# Patient Record
Sex: FEMALE | Race: WHITE
[De-identification: names, ages, dates, MRNs, and addresses within clinical notes are randomized per-mention and may not be internally consistent; named-entity substitution may affect disease eponyms.]

---

## 2018-05-23 ENCOUNTER — HOSPITAL ENCOUNTER (OUTPATIENT)
Dept: HOSPITAL 62 - ER | Age: 38
LOS: 1 days | Discharge: HOME | End: 2018-05-24
Attending: OBSTETRICS & GYNECOLOGY
Payer: COMMERCIAL

## 2018-05-23 DIAGNOSIS — E66.9: ICD-10-CM

## 2018-05-23 DIAGNOSIS — K66.1: Primary | ICD-10-CM

## 2018-05-23 DIAGNOSIS — O00.101: ICD-10-CM

## 2018-05-23 LAB
ADD MANUAL DIFF: NO
APPEARANCE UR: CLEAR
APTT PPP: YELLOW S
BASOPHILS # BLD AUTO: 0.1 10^3/UL (ref 0–0.2)
BASOPHILS NFR BLD AUTO: 0.4 % (ref 0–2)
BILIRUB UR QL STRIP: NEGATIVE
EOSINOPHIL # BLD AUTO: 0.1 10^3/UL (ref 0–0.6)
EOSINOPHIL NFR BLD AUTO: 0.6 % (ref 0–6)
ERYTHROCYTE [DISTWIDTH] IN BLOOD BY AUTOMATED COUNT: 13 % (ref 11.5–14)
GLUCOSE UR STRIP-MCNC: NEGATIVE MG/DL
HCT VFR BLD CALC: 36.8 % (ref 36–47)
HGB BLD-MCNC: 12.4 G/DL (ref 12–15.5)
KETONES UR STRIP-MCNC: NEGATIVE MG/DL
LYMPHOCYTES # BLD AUTO: 2.7 10^3/UL (ref 0.5–4.7)
LYMPHOCYTES NFR BLD AUTO: 16 % (ref 13–45)
MCH RBC QN AUTO: 30.1 PG (ref 27–33.4)
MCHC RBC AUTO-ENTMCNC: 33.8 G/DL (ref 32–36)
MCV RBC AUTO: 89 FL (ref 80–97)
MONOCYTES # BLD AUTO: 1 10^3/UL (ref 0.1–1.4)
MONOCYTES NFR BLD AUTO: 5.8 % (ref 3–13)
NEUTROPHILS # BLD AUTO: 13.1 10^3/UL (ref 1.7–8.2)
NEUTS SEG NFR BLD AUTO: 77.2 % (ref 42–78)
NITRITE UR QL STRIP: NEGATIVE
PH UR STRIP: 6 [PH] (ref 5–9)
PLATELET # BLD: 359 10^3/UL (ref 150–450)
PROT UR STRIP-MCNC: NEGATIVE MG/DL
RBC # BLD AUTO: 4.13 10^6/UL (ref 3.72–5.28)
SP GR UR STRIP: 1.01
TOTAL CELLS COUNTED % (AUTO): 100 %
UROBILINOGEN UR-MCNC: 4 MG/DL (ref ?–2)
WBC # BLD AUTO: 17 10^3/UL (ref 4–10.5)

## 2018-05-23 PROCEDURE — 88305 TISSUE EXAM BY PATHOLOGIST: CPT

## 2018-05-23 PROCEDURE — 76817 TRANSVAGINAL US OBSTETRIC: CPT

## 2018-05-23 PROCEDURE — 36415 COLL VENOUS BLD VENIPUNCTURE: CPT

## 2018-05-23 PROCEDURE — 96374 THER/PROPH/DIAG INJ IV PUSH: CPT

## 2018-05-23 PROCEDURE — 59151 TREAT ECTOPIC PREGNANCY: CPT

## 2018-05-23 PROCEDURE — 99285 EMERGENCY DEPT VISIT HI MDM: CPT

## 2018-05-23 PROCEDURE — 58120 DILATION AND CURETTAGE: CPT

## 2018-05-23 PROCEDURE — 81001 URINALYSIS AUTO W/SCOPE: CPT

## 2018-05-23 PROCEDURE — 93976 VASCULAR STUDY: CPT

## 2018-05-23 PROCEDURE — 86901 BLOOD TYPING SEROLOGIC RH(D): CPT

## 2018-05-23 PROCEDURE — 85025 COMPLETE CBC W/AUTO DIFF WBC: CPT

## 2018-05-23 PROCEDURE — 85027 COMPLETE CBC AUTOMATED: CPT

## 2018-05-23 PROCEDURE — 84702 CHORIONIC GONADOTROPIN TEST: CPT

## 2018-05-23 PROCEDURE — 96375 TX/PRO/DX INJ NEW DRUG ADDON: CPT

## 2018-05-23 PROCEDURE — 86900 BLOOD TYPING SEROLOGIC ABO: CPT

## 2018-05-23 NOTE — ER DOCUMENT REPORT
ED General





- General


Chief Complaint: Abdominal Pain


Stated Complaint: ABDOMINAL PAIN


Time Seen by Provider: 18 21:58


Notes: 





Patient is a 37-year-old female presents with complaint of severe abdominal 

pain started today.  She says she did have a small amount of bleeding that has 

since resolved.  No fevers.  No vomiting.  She is .  She has had 2 

previous miscarriages.  She recently found out she was pregnant.  [facility]  That time her ultrasound did not identify an IUP.  

Tonight started having the pain and therefore came to the ER.  She was formally 

on methotrexate due to history of psoriasis.  She has not had this in over 4 

months.


TRAVEL OUTSIDE OF THE [place] IN LAST 30 DAYS: No





- Related Data


Allergies/Adverse Reactions: 


 





diphenhydramine [From Benadryl] Allergy (Verified 18 22:37)


 


metoclopramide [From Reglan] Allergy (Verified 18 22:37)


 











Past Medical History





- Social History


Smoking Status: Current Every Day Smoker


Chew tobacco use (# tins/day): No


Frequency of alcohol use: None


Drug Abuse: Marijuana


Family History: Reviewed & Not Pertinent


Patient has suicidal ideation: No


Patient has homicidal ideation: No


Renal/ Medical History: Denies: Hx Peritoneal Dialysis





Review of Systems





- Review of Systems


Notes: 





My Normal Review Basic





REVIEW OF SYSTEMS:


CONSTITUTIONAL :  Denies fever,  chills, or sweats.  Denies recent illness.


CARDIOVASCULAR:  Denies chest pain.


RESPIRATORY:  Denies cough, cold, or chest congestion.  Denies shortness of 

breath, difficulty breathing, or wheezing.


GASTROINTESTINAL: Moderate abdominal pain.  Denies nausea, vomiting, or 

diarrhea. 


Genital: Pelvic pain, Currently pregnant, Some vaginal bleeding.


GENITOURINARY:  Denies difficulty urinating, painful urination, burning, 

frequency, or blood in urine


MUSCULOSKELETAL:  Denies neck or back pain or joint pain or swelling.


SKIN:   Denies rash or skin lesions.


NEUROLOGICAL:  Denies altered mental status or loss of consciousness.  Denies 

headache.  Denies weakness or paralysis or loss of use of either side.  Denies 

problems with gait or speech.  Denies sensory or motor loss.


ALL OTHER SYSTEMS REVIEWED AND NEGATIVE.





Physical Exam





- Vital signs


Vitals: 


 











Temp


 


 98.5 F 


 


 18 21:30














- Notes


Notes: 





General Appearance: Well nourished, alert, cooperative, no acute distress, 

moderate to severe obvious discomfort.


Vitals: reviewed, See vital signs table.


Head: no swelling or tenderness to the head


Eyes: PERRL, EOMI, Conjuctiva clear


Mouth: No decreasd moisture


Neck: Supple, no neck tenderness, No thyromegaly


Lungs: No wheezing, No rales, No rhonci, No accessory muscle use, good air 

exchange bilaterally.


Heart: Normal rate, Regular rythm, No murmur, no rub


Abdomen: Normal BS, soft, No rigidity, abdomen is slightly distended but soft 

to palpation.  She is tender diffusely but is worse in the lower abdomen and 

pelvic region.  Pain is not significantly lateralizing., No guarding, no rebound

, no abdominal masses, no organomegaly


Pelvic exam: Pelvic exam performed with female chaperone Deanna PCT.  

Patient has normal external genitalia.  No blood in vaginal vault.  Cervical 

office is currently closed.  No abnormal discharge.


Extremities: strength 5/5 in all extremities, good pulses in all extremities, 

no swelling or tenderness in the extremities, no edema.


Skin: warm, dry, appropriate color, no rash


Neuro: speech clear, oriented x 3, normal affect, responds appropriately to 

questions.





Course





- Re-evaluation


Re-evalutation: 





18 23:27


When initially evaluated the patient she was having significant amount of pain 

therefore I did a quick bedside FAST exam to rule out any free fluid in the 

abdomen pelvis.  There is none on my bedside FAST exam.  Patient continues to 

have increasing pain.  I reevaluate her again her abdomen remains very tender.  

I have informed them that we need to have the ultrasound performed bedside.  

They are going to call to have the transvaginal ultrasound performed bedside.  

She is on a monitor.  Heart rate is in the 90s.  Blood pressure systolically is 

145.  I did repeat the FAST exam again and there continues not be any evidence 

of free fluid in abdomen.  She received morphine and fentanyl.  I have just 

added Dilaudid as well.  Pelvic exam shows no blood in the vaginal vault and no 

clots in the cervical office.  Os is closed at this time.


18 00:50








Patient's ultrasound shows no intrauterine pregnancy and her hCG levels above 

2000.  There seems some adnexal lesions but no true ectopic pregnancy at this 

time.  Dr. Shepherd, obstetrician, agrees to evaluate the patient for concern for 

ectopic.


18 05:46





Dr. Shepherd did evaluate the patient and agrees with concern for ectopic and took 

the patient to the OR.





Dictation of this chart was performed using voice recognition software; 

therefore, there may be some unintended grammatical errors.





- Vital Signs


Vital signs: 


 











Temp Pulse Resp BP Pulse Ox


 


 97.8 F   76   16   101/48 L  99 


 


 18 04:45  18 04:45  18 04:45  18 04:45  18 04:45














- Laboratory


Result Diagrams: 


 18 22:18





Laboratory results interpreted by me: 


 











  18





  22:18 22:18 22:50


 


WBC  17.0 H  


 


Absolute Neutrophils  13.1 H  


 


Beta HCG, Quant   2843.80 H 


 


Urine Blood    SMALL H


 


Urine Urobilinogen    4.0 H














Discharge





- Discharge


Clinical Impression: 


Abdominal pain


Qualifiers:


 Abdominal location: unspecified location Qualified Code(s): R10.9 - 

Unspecified abdominal pain





Disposition: ADMITTED AS INPATIENT


Admitting Provider: [facility]


Unit Admitted: Post Partum

## 2018-05-24 VITALS — SYSTOLIC BLOOD PRESSURE: 128 MMHG | DIASTOLIC BLOOD PRESSURE: 86 MMHG

## 2018-05-24 LAB
ERYTHROCYTE [DISTWIDTH] IN BLOOD BY AUTOMATED COUNT: 12.8 % (ref 11.5–14)
HCT VFR BLD CALC: 24 % (ref 36–47)
HGB BLD-MCNC: 8.2 G/DL (ref 12–15.5)
MCH RBC QN AUTO: 30.6 PG (ref 27–33.4)
MCHC RBC AUTO-ENTMCNC: 34 G/DL (ref 32–36)
MCV RBC AUTO: 90 FL (ref 80–97)
PLATELET # BLD: 314 10^3/UL (ref 150–450)
RBC # BLD AUTO: 2.66 10^6/UL (ref 3.72–5.28)
WBC # BLD AUTO: 23.8 10^3/UL (ref 4–10.5)

## 2018-05-24 NOTE — OPERATIVE REPORT E
Operative Report



NAME: RAOUL PARISH

MRN:  [id number]          : 1980 AGE:  37Y

DATE OF SURGERY: 2018                        ROOM: ED07



PREOPERATIVE DIAGNOSIS:

ECTOPIC PREGNANCY.



POSTOPERATIVE DIAGNOSES:

1.  ECTOPIC PREGNANCY.

2.  HEMOPERITONEUM.



PROCEDURES:

1.  Suction dilation and curettage.

2.  Diagnostic laparoscopy with a right salpingectomy.



ESTIMATED BLOOD LOSS:

Approximately 500 mL from blood that was aspirated from her abdomen.



SURGEON:

SHERRELL INTERIANO M.D.



ANESTHESIA:

General.



DESCRIPTION OF PROCEDURE:

The patient was placed in a dorsal lithotomy position, prepped and draped

in the usual sterile fashion.  A speculum was placed.  Cervix was

visualized and grasped with a single-tooth tenaculum, sounded to a depth

of 9 cm.  Suction curettage was performed followed by sharp curettage

followed by repeat suction with minimal tissue being recovered.  The

single-tooth tenaculum was removed.



Hulka tenaculum was placed and the second was removed. Attention turned to

the abdomen, where the subumbilical incision was made. Trocar was

introduced with insufflation of the abdomen.  Noted there was a

hemoperitoneum with a large amounts of clot. A second punch hole made

above the symphysis and a *------*was introduced and a third made on the

left lateral to the umbilicus and an 10/11 was placed.  Using the bipolar

cautery, the right fallopian tube was removed. It contained the ectopic. 

It was placed in a Pleatman sac and removed.  Clot and blood were

aspirated from the abdomen.  Hemostasis was noted.  The instruments were

removed and deflated.  Trocar sleeves removed.  The fascia closed with 0

Vicryl and the skin was closed with subcutaneous 4-0 Vicryl.  The patient

tolerated the procedure well and was taken to recovery in good condition.





DICTATING PHYSICIAN:  SHERRELL INTERIANO M.D.





[id number]                  DT: 2018    [id number]

PHY#: [id number]            DD: 2018

ID:   [id number]           JOB#: [id number]       ACCT: [id number]



cc:SHERRELL INTERIANO M.D.

>

## 2018-05-24 NOTE — RADIOLOGY REPORT (SQ)
EXAM DESCRIPTION:

US PREGNANCY TRANSVAGINAL



CLINICAL HISTORY:

37 years Female, abdominal pain in pregnancy



Comparison: None.



TECHNIQUE: Transvaginal.



LIMITATIONS: None.



FINDINGS:



No intrauterine pregnancy identified. 0.9 cm endometrial stripe

thickness.



Heterogeneous, echogenic right adnexal mass measures 5.6 x 5.2 x

5.3 cm. Small right adnexal free fluid. Indeterminate 4.1 x 2.8 x

3.4 cm complex hypoechoic component of the left adnexa. Likely

3.9 x 3.5 x 3.6 cm complex hypoechoic left ovary. Bilateral

adnexal components demonstrate no significant vascularity.



IMPRESSION:



No intrauterine pregnancy identified. Indeterminate bilateral

adnexal components and small free right adnexal fluid.

Differential diagnosis includes occult early viable gestation,

gestational loss, and ectopic gestation. OB consultation advised.

## 2018-05-26 ENCOUNTER — HOSPITAL ENCOUNTER (EMERGENCY)
Dept: HOSPITAL 62 - ER | Age: 38
Discharge: HOME | End: 2018-05-26
Payer: COMMERCIAL

## 2018-05-26 VITALS — DIASTOLIC BLOOD PRESSURE: 60 MMHG | SYSTOLIC BLOOD PRESSURE: 123 MMHG

## 2018-05-26 DIAGNOSIS — R51: Primary | ICD-10-CM

## 2018-05-26 DIAGNOSIS — Z88.0: ICD-10-CM
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
## 2018-05-26 DIAGNOSIS — Z88.8: ICD-10-CM

## 2018-05-26 DIAGNOSIS — R00.0: ICD-10-CM

## 2018-05-26 DIAGNOSIS — T50.905A: ICD-10-CM

## 2018-05-26 DIAGNOSIS — Z98.890: ICD-10-CM

## 2018-05-26 DIAGNOSIS — G25.71: ICD-10-CM

## 2018-05-26 LAB
APPEARANCE UR: (no result)
APTT PPP: YELLOW S
BILIRUB UR QL STRIP: NEGATIVE
GLUCOSE UR STRIP-MCNC: NEGATIVE MG/DL
KETONES UR STRIP-MCNC: NEGATIVE MG/DL
NITRITE UR QL STRIP: NEGATIVE
PH UR STRIP: 7 [PH] (ref 5–9)
PROT UR STRIP-MCNC: NEGATIVE MG/DL
SP GR UR STRIP: 1.01
UROBILINOGEN UR-MCNC: 4 MG/DL (ref ?–2)

## 2018-05-26 PROCEDURE — 96372 THER/PROPH/DIAG INJ SC/IM: CPT

## 2018-05-26 PROCEDURE — 81001 URINALYSIS AUTO W/SCOPE: CPT

## 2018-05-26 PROCEDURE — 99284 EMERGENCY DEPT VISIT MOD MDM: CPT

## 2018-05-26 PROCEDURE — 96374 THER/PROPH/DIAG INJ IV PUSH: CPT

## 2018-05-26 PROCEDURE — 96361 HYDRATE IV INFUSION ADD-ON: CPT

## 2018-05-26 PROCEDURE — 96375 TX/PRO/DX INJ NEW DRUG ADDON: CPT

## 2018-05-26 NOTE — ER DOCUMENT REPORT
ED General





- General


Chief Complaint: Fever


Stated Complaint: FEVER


Time Seen by Provider: 05/26/18 13:03


Notes: 


The patient is a 37-year-old female who presents with a dull frontal headache 

intermittently for the past 4 days.  She had an emergent laparoscopic D&C 4 

days ago after a ruptured ectopic pregnancy.  She is having mild fevers up to 

101 at home, but this resolved 2 days ago.  She is taking 800 mg Motrin with 

some relief of her headache, but it is still present.  Patient received 

Compazine and Toradol in triage and her headache has mostly resolved, but she 

is feeling jittery, similar to when she had Reglan in the past.  She denies 

neck stiffness, altered mental status, sudden onset of headache, urinary 

symptoms, flank pain, cough, nausea, vomiting, shortness of breath or sore 

throat.


TRAVEL OUTSIDE OF THE [place] IN LAST 30 DAYS: No





- Related Data


Allergies/Adverse Reactions: 


 





metoclopramide [From Reglan] Allergy (Verified 05/23/18 22:37)


 


Penicillins Allergy (Verified 05/26/18 13:02)


 


prochlorperazine [From Compazine] Allergy (Verified 05/26/18 15:32)


 











Past Medical History





- General


Information source: Patient





- Social History


Smoking Status: Never Smoker


Chew tobacco use (# tins/day): No


Drug Abuse: None


Family History: Reviewed & Not Pertinent


Patient has suicidal ideation: No


Patient has homicidal ideation: No


Renal/ Medical History: Denies: Hx Peritoneal Dialysis


Past Surgical History: Reports: Hx Gynecologic Surgery - tubal ruptured and 

removed on right side-sx 5/24/18





Review of Systems





- Review of Systems


Notes: 


REVIEW OF SYSTEMS:


CONSTITUTIONAL: +fevers, -chills


EENT: -eye pain, -difficulty swallowing, -nasal congestion


CARDIOVASCULAR: -chest pain, -syncope.


RESPIRATORY: -cough, -SOB


GASTROINTESTINAL: -abdominal pain, -nausea, -vomiting, -diarrhea


GENITOURINARY: -dysuria, -hematuria


MUSCULOSKELETAL: -back pain, -neck pain


SKIN: -rash or skin lesions.


HEMATOLOGIC: -easy bruising or bleeding.


LYMPHATIC: -swollen, enlarged glands.


NEUROLOGICAL: -altered mental status or loss of consciousness, +headache, -

neurologic symptoms


PSYCHIATRIC: -anxiety, -depression.


ALL OTHER SYSTEMS REVIEWED AND NEGATIVE.





Physical Exam





- Vital signs


Vitals: 


 











Temp Pulse Resp BP Pulse Ox


 


 98.8 F   102 H  16   153/74 H  100 


 


 05/26/18 13:00  05/26/18 13:00  05/26/18 13:00  05/26/18 13:00 05/26/18 13:00














- Notes


Notes: 


PHYSICAL EXAMINATION:


GENERAL: Well-appearing, well-nourished and in no acute distress.


HEAD: Atraumatic, normocephalic.


EYES: Pupils equal round and reactive to light, extraocular movements intact, 

sclera anicteric, conjunctiva are normal.


ENT: nares patent, oropharynx clear without exudates.  Moist mucous membranes.


NECK: Normal range of motion, supple without lymphadenopathy. No nuchal 

rigidity.


LUNGS: Breath sounds clear to auscultation bilaterally and equal.  No wheezes 

rales or rhonchi.


HEART: Tachycardia, regular rhythm.


ABDOMEN: Soft, nontender, normoactive bowel sounds.  No guarding, no rebound.  

No masses appreciated.


EXTREMITIES: Normal range of motion, no pitting or edema.  No cyanosis.


NEUROLOGICAL: Cranial nerves grossly intact.  Normal speech, normal gait.  

Normal sensory and motor exams.


PSYCH: Normal mood, normal affect.


SKIN: Ecchymosis over anterior abdomen.





Course





- Re-evaluation


Re-evalutation: 


Patient appears well.  Her headache has completely resolved after the Compazine 

and Toradol, but she is now having extrapyramidal symptoms on my evaluation.  

Provided her with Benadryl and IV fluids with complete relief of her headache. 

She was still having some akithesia, which resolved after Cogentin. Her 

surgical wounds are clean and no signs of infection.  She also has no abdominal 

tenderness to suggest an intra-abdominal infection.  She has absolutely no 

signs of meningitis at this time and has full range of her neck with negative 

Brudzinski's and Kernig's signs.  Do not suspect SAH or ICH at this time.  Told 

to continue anti-inflammatories, Tylenol and oral fluids follow-up with her 

primary care physician.  Given strict return precautions and she understands.





- Vital Signs


Vital signs: 


 











Temp Pulse Resp BP Pulse Ox


 


 98.3 F   90   16   123/60   98 


 


 05/26/18 15:30  05/26/18 15:30  05/26/18 15:30  05/26/18 15:30  05/26/18 15:30














- Laboratory


Laboratory results interpreted by me: 


 











  05/26/18





  14:25


 


Urine Blood  SMALL H


 


Urine Urobilinogen  4.0 H


 


Ur Leukocyte Esterase  SMALL H














Discharge





- Discharge


Clinical Impression: 


Headache


Qualifiers:


 Headache type: unspecified Headache chronicity pattern: unspecified pattern 

Intractability: not intractable Qualified Code(s): R51 - Headache





Condition: Stable


Disposition: HOME, SELF-CARE


Additional Instructions: 


HEADACHE:





     The physician does not feel that the headache you are experiencing has a 

serious underlying cause.  Most headaches are due to emotional stress, with 

resultant muscle tension (tension headache). Occasionally, headaches are 

secondary to changes in the blood vessels of the scalp (vascular headache and 

migraine headache).  Sometimes, a headache is the first symptom of another 

developing illness, such as a viral infection.  You have no evidence of stroke, 

bleeding, meningitis, or other serious cause of your headache.


     The treatment of headaches varies with the severity and cause of the pain.

  Not all headaches need pain shots.  In fact, there is evidence that using 

narcotics for headaches may make them worse in the long run.  The physician 

will determine the therapy that's in your best interest.


     If you develop a fever, if the headache is different from any you've 

previously experienced, or if the headache progressively worsens, then call 

your physician at once or go to the emergency room.








USE OF DIPHENHYDRAMINE:


     Diphenhydramine (Benadryl) is an antihistamine and has been recommended to 

help treat your headache and to prevent side effects of other medications used 

to treat headaches.  The medication can be repeated four times daily.


     Age         Elixir (12.5 mg/tsp)         25 mg pill


     adult                                     1-2 tabs


     Antihistamines may cause drowsiness, especially with the first dose.  Do 

not operate machinery or drive while under the effects of the medication.  Do 

not combine the medication with alcohol, or with any other medication without 

talking to your doctor.








[place] MEDICATION:


     You have been given a medication to suppress nausea and vomiting. This 

type of medication can be given as a shot, pill, or suppository. It will 

usually last for many hours.  Pills and shots usually last six to eight hours, 

suppositories last about 12 hours.  For the typical illness, only one or two 

doses of the medication may be necessary.


     Mild lightheadedness may occur.  This type of medicine can cause 

drowsiness.  Do not drive or operate dangerous machinery while under its 

influence.  Do not mix with alcohol.


     See your doctor at once if you have muscle spasms or tightness, or 

uncontrollable motions (particularly of the neck, mouth, or jaw). Persistent 

vomiting or severe lightheadedness should also be evaluated by the physician.








INTRAVENOUS COMPAZINE FOR HEADACHE:


     You have received therapy for headaches, using intravenous Compazine.  

This treatment is dramatically successful in relieving the headache in about 50 

percent of cases.  When it works, it provides a rapid method of eliminating the 

headache without resorting to narcotics (and the problems associated with them).


     Most patients still feel fully alert after the Compazine, but others may 

be slightly drowsy.  It's best not to drive or work with machinery for six to 

eight hours.  Do not take alcohol or other medication unless you discuss it 

with the doctor.


     If you develop tightness and spasms in your muscles, especially the neck 

and tongue, you should return.  This is a side effect which can be treated.








TORADOL INJECTION:


     You have been given an injection of ketorolac tromethamine (Toradol).  

This is an excellent, safe drug for pain control.  It also has potent 

antiinflammatory action.  You should have significant pain relief within about 

one hour.


     Toradol is not addicting and is non-sedating.  It does not interfere with 

driving or work.


     Call or return if you develop itching, hives, shortness of breath, or rash.








FOLLOW-UP CARE:


If you have been referred to a physician for follow-up care, call the physician

s office for an appointment as you were instructed or within the next two days.

  If you experience worsening or a significant change in your symptoms, notify 

the physician immediately or return to the Emergency Department at any time for 

re-evaluation.


Forms:  Elevated Blood Pressure


Referrals: 


NIA [facility] MD [NO LOCAL MD] - Follow up as needed

## 2018-05-26 NOTE — ER DOCUMENT REPORT
ED Medical Screen (RME)





- General


Chief Complaint: Fever


Stated Complaint: FEVER


Time Seen by Provider: 05/26/18 13:03


Notes: 





RAPID MEDICAL EVALUATION DISCLOSURE


I have seen this patient as part of a Rapid Medical Evaluation and, if 

applicable, placed any initially appropriate orders. The patient will be seen 

and fully evaluated, including a full history and physical exam, by a provider (

in Main ED or Fast Track) when a room becomes available.





------------------------------------------------------------------





37-year-old female here with complaints of fevers and headache ongoing for the 

past 4 days.  Light and sound did not make the headache worse (however the 

patient is wearing her sunglasses inside).  She has tried Tylenol and Motrin, 

last dose 9:45 AM without much relief.  Denies numbness tingling weakness.  Of 

note, just had surgery recently for "tubal pregnancy".


TRAVEL OUTSIDE OF THE [place] IN LAST 30 DAYS: No





- Related Data


Allergies/Adverse Reactions: 


 





diphenhydramine [From Benadryl] Allergy (Verified 05/23/18 22:37)


 


metoclopramide [From Reglan] Allergy (Verified 05/23/18 22:37)


 


Penicillins Allergy (Verified 05/26/18 13:02)


 











Past Medical History





- Social History


Chew tobacco use (# tins/day): No


Drug Abuse: None


Renal/ Medical History: Denies: Hx Peritoneal Dialysis


Past Surgical History: Reports: Hx Gynecologic Surgery - tubal ruptured and 

removed on right side-sx 5/24/18